# Patient Record
Sex: FEMALE | Race: BLACK OR AFRICAN AMERICAN | NOT HISPANIC OR LATINO | Employment: PART TIME | URBAN - METROPOLITAN AREA
[De-identification: names, ages, dates, MRNs, and addresses within clinical notes are randomized per-mention and may not be internally consistent; named-entity substitution may affect disease eponyms.]

---

## 2017-05-14 ENCOUNTER — HOSPITAL ENCOUNTER (EMERGENCY)
Facility: HOSPITAL | Age: 22
Discharge: HOME | End: 2017-05-14
Attending: EMERGENCY MEDICINE
Payer: MEDICAID

## 2017-05-14 VITALS
SYSTOLIC BLOOD PRESSURE: 130 MMHG | DIASTOLIC BLOOD PRESSURE: 89 MMHG | WEIGHT: 163 LBS | BODY MASS INDEX: 24.14 KG/M2 | HEART RATE: 94 BPM | TEMPERATURE: 99 F | OXYGEN SATURATION: 98 % | RESPIRATION RATE: 20 BRPM | HEIGHT: 69 IN

## 2017-05-14 DIAGNOSIS — L02.414 ABSCESS OF LEFT FOREARM: ICD-10-CM

## 2017-05-14 DIAGNOSIS — Z76.89 ENCOUNTER FOR INCISION AND DRAINAGE PROCEDURE: Primary | ICD-10-CM

## 2017-05-14 LAB
B-HCG UR QL: NEGATIVE
CTP QC/QA: YES

## 2017-05-14 PROCEDURE — 99283 EMERGENCY DEPT VISIT LOW MDM: CPT | Mod: 25

## 2017-05-14 PROCEDURE — 10060 I&D ABSCESS SIMPLE/SINGLE: CPT

## 2017-05-14 PROCEDURE — 25000003 PHARM REV CODE 250: Performed by: NURSE PRACTITIONER

## 2017-05-14 PROCEDURE — 81025 URINE PREGNANCY TEST: CPT | Performed by: NURSE PRACTITIONER

## 2017-05-14 RX ORDER — LIDOCAINE HYDROCHLORIDE 10 MG/ML
10 INJECTION INFILTRATION; PERINEURAL ONCE
Status: COMPLETED | OUTPATIENT
Start: 2017-05-14 | End: 2017-05-14

## 2017-05-14 RX ORDER — OXYCODONE AND ACETAMINOPHEN 5; 325 MG/1; MG/1
1 TABLET ORAL
Status: COMPLETED | OUTPATIENT
Start: 2017-05-14 | End: 2017-05-14

## 2017-05-14 RX ORDER — HYDROCODONE BITARTRATE AND ACETAMINOPHEN 5; 325 MG/1; MG/1
1 TABLET ORAL EVERY 6 HOURS PRN
COMMUNITY

## 2017-05-14 RX ORDER — SULFAMETHOXAZOLE AND TRIMETHOPRIM 800; 160 MG/1; MG/1
1 TABLET ORAL
COMMUNITY

## 2017-05-14 RX ORDER — HYDROCODONE BITARTRATE AND ACETAMINOPHEN 5; 325 MG/1; MG/1
1 TABLET ORAL EVERY 4 HOURS PRN
Qty: 10 TABLET | Refills: 0 | Status: SHIPPED | OUTPATIENT
Start: 2017-05-14

## 2017-05-14 RX ADMIN — LIDOCAINE HYDROCHLORIDE 10 ML: 10 INJECTION, SOLUTION INFILTRATION; PERINEURAL at 12:05

## 2017-05-14 RX ADMIN — OXYCODONE AND ACETAMINOPHEN 1 TABLET: 5; 325 TABLET ORAL at 12:05

## 2017-05-14 NOTE — ED PROVIDER NOTES
"Encounter Date: 5/14/2017       History     Chief Complaint   Patient presents with    Abscess     left inner forearm, seen at [facility] and prescribed oral antibiotics with pain med no I&D performed there, abscess now has drainage and more painful     Review of patient's allergies indicates:  No Known Allergies  HPI Comments: 23yo female presents for an evaluation of a left forearm abscess. Pt reports that about a week ago, she thought a spider bit her left foearm so she "tried to pop it" but the area got worse.  She went to [facility] ER on Friday and was given Bactrim, Norco, and motrin for the pain.  Pt states the area has been draining purulent material since Friday after she has been doing warm compresses to the area.  She reports that she has been taking her Bactrim as directed, but feels the area is getting worse "because it is draining and won't stop."  She denies fever, increased redness, increased pain, and vomiting.  She has never had an abscess previously.  Pt denies IV drug use.      Patient is a 22 y.o. female presenting with the following complaint: abscess. The history is provided by the patient.   Abscess    This is a new problem. The current episode started several days ago. The problem occurs continuously. The problem has been gradually worsening. Affected Location: left forearm. The pain is at a severity of 9/10. The abscess is characterized by draining, swelling, painfulness and redness. Pertinent negatives include no anorexia, no fever, no vomiting, no rhinorrhea, no sore throat and no cough. Her past medical history does not include atopy in family. There were no sick contacts. Recently, medical care has been given at another facility. Services received include medications given.     History reviewed. No pertinent past medical history.  History reviewed. No pertinent surgical history.  History reviewed. No pertinent family history.  Social History   Substance Use Topics    Smoking status: " Never Smoker    Smokeless tobacco: None    Alcohol use No     Review of Systems   Constitutional: Negative for chills, fatigue and fever.   HENT: Negative for rhinorrhea and sore throat.    Respiratory: Negative for cough.    Cardiovascular: Negative for chest pain.   Gastrointestinal: Negative for abdominal pain, anorexia, nausea and vomiting.   Musculoskeletal: Negative for myalgias.   Skin: Negative for rash.        Abscess left forearm     Allergic/Immunologic: Negative for immunocompromised state.   Neurological: Negative for weakness.   Psychiatric/Behavioral: Negative for confusion.   All other systems reviewed and are negative.      Physical Exam   Initial Vitals   BP Pulse Resp Temp SpO2   05/14/17 1126 05/14/17 1126 05/14/17 1126 05/14/17 1126 05/14/17 1126   130/89 94 20 98.5 °F (36.9 °C) 98 %     Physical Exam    Nursing note and vitals reviewed.  Constitutional: Vital signs are normal. She appears well-developed and well-nourished. She is active and cooperative. She is easily aroused.  Non-toxic appearance. She does not have a sickly appearance. She does not appear ill. No distress.   HENT:   Head: Normocephalic and atraumatic.   Eyes: Conjunctivae and EOM are normal.   Neck: Normal range of motion. Neck supple.   Cardiovascular: Normal rate.   Pulses:       Radial pulses are 2+ on the right side, and 2+ on the left side.   Pulmonary/Chest: Effort normal.   Abdominal: Soft. Normal appearance and bowel sounds are normal.   Musculoskeletal:        Left forearm: She exhibits tenderness. She exhibits no bony tenderness, no swelling, no edema, no deformity and no laceration.   Lymphadenopathy:     She has no axillary adenopathy.        Left: No epitrochlear adenopathy present.   Neurological: She is alert, oriented to person, place, and time and easily aroused. She has normal strength. GCS eye subscore is 4. GCS verbal subscore is 5. GCS motor subscore is 6.   Skin: Skin is warm, dry and intact. Abscess  (left forearm 2cm diameter, draining small amount of yellow drainage. ) noted. No rash noted. There is erythema.        Psychiatric: She has a normal mood and affect. Her speech is normal and behavior is normal. Judgment and thought content normal. Cognition and memory are normal.             ED Course   I & D - Incision and Drainage  Date/Time: 5/14/2017 12:30 PM  Location procedure was performed: [facility] EMERGENCY DEPARTMENT  Performed by: MELODIE BAUTISTA  Authorized by: MARY MISTRY   Pre-operative diagnosis: abscess  Post-operative diagnosis: abscess  Consent Done: Emergent Situation  Type: abscess  Body area: upper extremity  Location details: left arm  Anesthesia: local infiltration    Anesthesia:  Anesthesia: local infiltration  Local Anesthetic: lidocaine 1% without epinephrine   Anesthetic total: 4 mL  Patient sedated: no  Description of findings: Small amount of yellow pus   Scalpel size: 11  Incision type: single straight  Complexity: simple  Drainage: pus  Drainage amount: moderate  Wound treatment: incision,  drainage,  wound left open,  deloculation and  wound packed  Packing material: 1/2 in gauze  Complications: No  Estimated blood loss (mL): 3  Specimens: No  Implants: No  Patient tolerance: Patient tolerated the procedure well with no immediate complications        Labs Reviewed   POCT URINE PREGNANCY             Medical Decision Making:   Initial Assessment:   21yo female here for abscess of left forearm for nearly one week.  On Bactrim, taking as prescribed. Pt reports the abscess has been draining since Friday. Afebrile, tolerating PO. Left forearm abscess with mild surrounding erythema. No lymphadenopathy. Td is UTD.   Differential Diagnosis:   Abscess, cellulitis,   Clinical Tests:   Lab Tests: Ordered and Reviewed  The following lab test(s) were unremarkable: UPT  ED Management:  PO percocet, UPT, I&D  Improved abscess after I&D. Advised wound recheck in 2 days and continued use  of Bactrim as directed on the labeling.  Pt took a picture of the area with her phone and she was advised to monitor the area for change, progresses, or increased redness or swelling.  RX Norco.  Pt and boyfriend verbalized understanding, compliance, and agreement with treatment plan.                Attending Attestation:     Physician Attestation Statement for NP/PA:   I discussed this assessment and plan of this patient with the NP/PA, but I did not personally examine the patient. The face to face encounter was performed by the NP/PA.                  ED Course     Clinical Impression:   The primary encounter diagnosis was Encounter for incision and drainage procedure. A diagnosis of Abscess of left forearm was also pertinent to this visit.          EAMON Snowden  05/14/17 1519       Tereso Wilson MD  05/14/17 7467

## 2017-05-14 NOTE — ED AVS SNAPSHOT
[facility] [address]  [place] [address]               Alan Miller   2017 11:28 AM   ED    Description:  Female : 1995   Department:  [facility]           Your Care was Coordinated By:     Provider Role From To    Tereso Wilson MD Attending Provider 17 [phone] --    EAMON Snowden Nurse Practitioner 17 1136 --      Reason for Visit     Abscess           Diagnoses this Visit        Comments    Encounter for incision and drainage procedure    -  Primary     Abscess of left forearm           ED Disposition     None           To Do List           Follow-up Information     Follow up with Michael Ruano MD. Schedule an appointment as soon as possible for a visit in 2 days.    Specialty:  Pediatrics    Why:  For wound re-check    Contact information:    [address]  [place] [address]  [phone]         These Medications        Disp Refills Start End    hydrocodone-acetaminophen 5-325mg (NORCO) 5-325 mg per tablet 10 tablet 0 2017     Take 1 tablet by mouth every 4 (four) hours as needed for Pain (No driving. NO additional tylenol.  May be sedating.). - Oral      [facility]     [facility] On [facility] Nurse Care Line -  Assistance  Unless otherwise directed by your provider, please contact [facility], our nurse care line that is available for  assistance.     Registered nurses in the [facility] provide: appointment scheduling, clinical advisement, health education, and other advisory services.  Call: [phone] (toll free)               Medications           Message regarding Medications     Verify the changes and/or additions to your medication regime listed below are the same as discussed with your clinician today.  If any of these changes or additions are incorrect, please notify your healthcare provider.        START taking these  "NEW medications        Refills    hydrocodone-acetaminophen 5-325mg (NORCO) 5-325 mg per tablet 0    Sig: Take 1 tablet by mouth every 4 (four) hours as needed for Pain (No driving. NO additional tylenol.  May be sedating.).    Class: Print    Route: Oral      These medications were administered today        Dose Freq    oxycodone-acetaminophen 5-325 mg per tablet 1 tablet 1 tablet ED 1 Time    Sig: Take 1 tablet by mouth ED 1 Time.    Class: Normal    Route: Oral    Cosign for Ordering: Accepted by Tereso Wilson MD on 5/14/2017 12:06 PM    lidocaine HCL 10 mg/ml (1%) injection 10 mL 10 mL Once    Sig: 10 mLs by Other route once.    Class: Normal    Route: Other           Verify that the below list of medications is an accurate representation of the medications you are currently taking.  If none reported, the list may be blank. If incorrect, please contact your healthcare provider. Carry this list with you in case of emergency.           Current Medications     hydrocodone-acetaminophen 5-325mg (NORCO) 5-325 mg per tablet Take 1 tablet by mouth every 6 (six) hours as needed for Pain.    sulfamethoxazole-trimethoprim 800-160mg (BACTRIM DS) 800-160 mg Tab Take 1 tablet by mouth every 12 (twelve) hours.    hydrocodone-acetaminophen 5-325mg (NORCO) 5-325 mg per tablet Take 1 tablet by mouth every 4 (four) hours as needed for Pain (No driving. NO additional tylenol.  May be sedating.).    lidocaine HCl 2% (LIDOCAINE VISCOUS) 2 % Soln by Mucous Membrane route every 4 (four) hours. DILUTE 5 ML IN SMALL AMOUNT OF WARM WATER, GARGLE AND SPIT FOR RELIEF OF SORE THROAT.    naproxen (NAPROSYN) 375 MG tablet Take 1 tablet (375 mg total) by mouth 2 (two) times daily with meals.           Clinical Reference Information           Your Vitals Were     BP Pulse Temp Resp Height Weight    130/89 94 98.5 °F (36.9 °C) 20 5' 9" (1.753 m) 73.9 kg (163 lb)    SpO2 BMI             98% 24.07 kg/m2         Allergies as of 5/14/2017     " No Known Allergies      Immunizations Administered on Date of Encounter - 5/14/2017     None      ED Micro, Lab, POCT     Start Ordered       Status Ordering Provider    05/14/17 1146 05/14/17 1145  POCT urine pregnancy  Once      Final result       ED Imaging Orders     None        Discharge Instructions         Abscess (Incision & Drainage)  An abscess (sometimes called a boil) occurs when bacteria get trapped under the skin and start to grow. Pus forms inside the abscess as the body responds to the bacteria. An abscess can happen with an insect bite, ingrown hair, blocked oil gland, pimple, cyst, or puncture wound.  Your healthcare provider has drained the pus from your abscess. If the abscess pocket was large, your healthcare provider may have inserted gauze packing. Your provider will need to remove and possibly replace it on your next visit. You may not need antibiotics to treat a simple abscess, unless the infection is spreading into the skin around the wound (cellulitis).  Healing of the wound will take about 1 to 2 weeks, depending on the size of the abscess. Healthy tissue will grow from the bottom and sides of the opening until it seals over.  Home care  These tips can help your wound heal:  · The wound may drain for the first 2 days. Cover the wound with a clean dry dressing. If the dressing becomes soaked with blood or pus, change it.  · If a gauze packing was placed inside the abscess cavity, you may be told to remove it yourself. You may do this in the shower. Once the packing is removed, you should wash the area in the shower or bath 3 to 4 times a day, until the skin opening has closed. Make sure you wash your hands after changing the packing or cleaning the wound.  · If you were prescribed antibiotics, take them as directed until they are all gone.  · You may use acetaminophen or ibuprofen to control pain, unless another pain medicine was prescribed. If you have liver disease or ever had a  stomach ulcer, talk with your doctor before using these medicines.  Follow-up care  Follow up with your healthcare provider, or as advised. If a gauze packing was inserted in your wound, it should be removed in 1 to 2 days. Check your wound every day for the signs of worsening infection listed below.  When to seek medical advice  Call your healthcare provider right away if any of these occur:  · Increasing redness or swelling  · Red streaks in the skin leading away from the wound  · Increasing local pain or swelling  · Continued pus draining from the wound 2 days after treatment  · Fever of 100.4ºF (38ºC) or higher, or as directed by your healthcare provider  · Boil returns when you are at home  Date Last Reviewed: 9/1/2016  © 4949-7034 [employer]. [address] [place] [address]. All rights reserved. This information is not intended as a substitute for professional medical care. Always follow your healthcare professional's instructions.          [facility] Sign-Up     Activating your [facility] account is as easy as 1-2-3!     1) Visit [email or web address], select Sign Up Now, enter this activation code and your date of birth, then select Next.  [email or web address]  Expires: 6/28/2017 12:44 PM      2) Create a username and password to use when you visit [facility] in the future and select a security question in case you lose your password and select Next.    3) Enter your e-mail address and click Sign Up!    Additional Information  If you have questions, please e-mail [email or web address] or call [phone] to talk to our [facility] staff. Remember, [facility] is NOT to be used for urgent needs. For medical emergencies, dial 911.          [facility] complies with applicable Federal civil rights laws and does not discriminate on the basis of race, color, national origin, age, disability, or sex.        Language Assistance Services     ATTENTION: Language assistance services are  available, free of charge. Please call [phone].      ATENCIÓN: Si habla español, tiene a goodson disposición servicios gratuitos de asistencia lingüística. Llame al [phone].     CHÚ Ý: N?u b?n nói Ti?ng Vi?t, có các d?ch v? h? tr? ngôn ng? mi?n phí dành cho b?n. G?i s? [phone].

## 2017-05-14 NOTE — DISCHARGE INSTRUCTIONS
Abscess (Incision & Drainage)  An abscess (sometimes called a boil) occurs when bacteria get trapped under the skin and start to grow. Pus forms inside the abscess as the body responds to the bacteria. An abscess can happen with an insect bite, ingrown hair, blocked oil gland, pimple, cyst, or puncture wound.  Your healthcare provider has drained the pus from your abscess. If the abscess pocket was large, your healthcare provider may have inserted gauze packing. Your provider will need to remove and possibly replace it on your next visit. You may not need antibiotics to treat a simple abscess, unless the infection is spreading into the skin around the wound (cellulitis).  Healing of the wound will take about 1 to 2 weeks, depending on the size of the abscess. Healthy tissue will grow from the bottom and sides of the opening until it seals over.  Home care  These tips can help your wound heal:  · The wound may drain for the first 2 days. Cover the wound with a clean dry dressing. If the dressing becomes soaked with blood or pus, change it.  · If a gauze packing was placed inside the abscess cavity, you may be told to remove it yourself. You may do this in the shower. Once the packing is removed, you should wash the area in the shower or bath 3 to 4 times a day, until the skin opening has closed. Make sure you wash your hands after changing the packing or cleaning the wound.  · If you were prescribed antibiotics, take them as directed until they are all gone.  · You may use acetaminophen or ibuprofen to control pain, unless another pain medicine was prescribed. If you have liver disease or ever had a stomach ulcer, talk with your doctor before using these medicines.  Follow-up care  Follow up with your healthcare provider, or as advised. If a gauze packing was inserted in your wound, it should be removed in 1 to 2 days. Check your wound every day for the signs of worsening infection listed below.  When to seek  medical advice  Call your healthcare provider right away if any of these occur:  · Increasing redness or swelling  · Red streaks in the skin leading away from the wound  · Increasing local pain or swelling  · Continued pus draining from the wound 2 days after treatment  · Fever of 100.4ºF (38ºC) or higher, or as directed by your healthcare provider  · Boil returns when you are at home  Date Last Reviewed: 9/1/2016  © 4842-1263 [employer]. [address] [place] [address]. All rights reserved. This information is not intended as a substitute for professional medical care. Always follow your healthcare professional's instructions.

## 2017-05-14 NOTE — ED NOTES
Open draining abscess to left inner forearm with red, cellulitic area surrounding.  Ongoing x 1 1/2 weeks, was seen at [place] and prescribed abx, but has not improved.

## 2017-05-17 ENCOUNTER — HOSPITAL ENCOUNTER (EMERGENCY)
Facility: HOSPITAL | Age: 22
Discharge: HOME | End: 2017-05-17
Attending: EMERGENCY MEDICINE
Payer: MEDICAID

## 2017-05-17 VITALS
SYSTOLIC BLOOD PRESSURE: 130 MMHG | OXYGEN SATURATION: 98 % | TEMPERATURE: 99 F | BODY MASS INDEX: 24.14 KG/M2 | WEIGHT: 163 LBS | HEART RATE: 87 BPM | HEIGHT: 69 IN | RESPIRATION RATE: 20 BRPM | DIASTOLIC BLOOD PRESSURE: 93 MMHG

## 2017-05-17 DIAGNOSIS — Z51.89 WOUND CHECK, ABSCESS: Primary | ICD-10-CM

## 2017-05-17 PROCEDURE — 99281 EMR DPT VST MAYX REQ PHY/QHP: CPT

## 2017-05-17 NOTE — ED AVS SNAPSHOT
[facility]  [address]  [place] [address]               Alan Miller   2017 11:50 AM   ED    Description:  Female : 1995   Department:  [facility]           Your Care was Coordinated By:     Provider Role From To    Nick Powers MD Attending Provider 17 [phone] --      Reason for Visit     Wound Check           Diagnoses this Visit        Comments    Wound check, abscess    -  Primary       ED Disposition     None           To Do List           Follow-up Information     Schedule an appointment as soon as possible for a visit with Michael Ruano MD.    Specialty:  Pediatrics    Contact information:    [address]  [address]  [place] [address]  [phone]        [facility] On Call     [facility] Nurse Care Line -  Assistance  Unless otherwise directed by your provider, please contact [facility], our nurse care line that is available for  assistance.     Registered nurses in the [facility] provide: appointment scheduling, clinical advisement, health education, and other advisory services.  Call: [phone] (toll free)               Medications           Message regarding Medications     Verify the changes and/or additions to your medication regime listed below are the same as discussed with your clinician today.  If any of these changes or additions are incorrect, please notify your healthcare provider.             Verify that the below list of medications is an accurate representation of the medications you are currently taking.  If none reported, the list may be blank. If incorrect, please contact your healthcare provider. Carry this list with you in case of emergency.           Current Medications     hydrocodone-acetaminophen 5-325mg (NORCO) 5-325 mg per tablet Take 1 tablet by mouth every 6 (six) hours as needed for Pain.    hydrocodone-acetaminophen 5-325mg (NORCO)  "5-325 mg per tablet Take 1 tablet by mouth every 4 (four) hours as needed for Pain (No driving. NO additional tylenol.  May be sedating.).    lidocaine HCl 2% (LIDOCAINE VISCOUS) 2 % Soln by Mucous Membrane route every 4 (four) hours. DILUTE 5 ML IN SMALL AMOUNT OF WARM WATER, GARGLE AND SPIT FOR RELIEF OF SORE THROAT.    naproxen (NAPROSYN) 375 MG tablet Take 1 tablet (375 mg total) by mouth 2 (two) times daily with meals.    sulfamethoxazole-trimethoprim 800-160mg (BACTRIM DS) 800-160 mg Tab Take 1 tablet by mouth every 12 (twelve) hours.           Clinical Reference Information           Your Vitals Were     BP Pulse Temp Resp Height Weight    130/93 87 99.1 °F (37.3 °C) 20 5' 9" (1.753 m) 73.9 kg (163 lb)    SpO2 BMI             98% 24.07 kg/m2         Allergies as of 5/17/2017     No Known Allergies      Immunizations Administered on Date of Encounter - 5/17/2017     None      ED Micro, Lab, POCT     None      ED Imaging Orders     None      Discharge References/Attachments     ABSCESS, INCISION AND DRAINAGE (ENGLISH)    WOUND CARE (ENGLISH)      [facility] Sign-Up     Activating your [facility] account is as easy as 1-2-3!     1) Visit [email or web address], select Sign Up Now, enter this activation code and your date of birth, then select Next.  [email or web address]  Expires: 6/28/2017 12:44 PM      2) Create a username and password to use when you visit [facility] in the future and select a security question in case you lose your password and select Next.    3) Enter your e-mail address and click Sign Up!    Additional Information  If you have questions, please e-mail [email or web address] or call [phone] to talk to our [facility] staff. Remember, [facility] is NOT to be used for urgent needs. For medical emergencies, dial 911.          [facility] complies with applicable Federal civil rights laws and does not discriminate on the basis of race, color, national origin, age, disability, or sex.      "   Language Assistance Services     ATTENTION: Language assistance services are available, free of charge. Please call [phone].      ATENCIÓN: Si habla francisañol, tiene a goodson disposición servicios gratuitos de asistencia lingüística. Llame al [phone].     CHÚ Ý: N?u b?n nói Ti?ng Vi?t, có các d?ch v? h? tr? ngôn ng? mi?n phí dành cho b?n. G?i s? [phone].

## 2017-05-17 NOTE — ED PROVIDER NOTES
Encounter Date: 5/17/2017       History     Chief Complaint   Patient presents with    Wound Check     left forearm wound check, I&D done with packing     Review of patient's allergies indicates:  No Known Allergies  HPI Comments: 23 y/o F presents to the ED for wound check and packing removal. Caryn was seen here for I&D of left anterior mid forearm abscess and packing was placed. Px reports her pain has improved, denies any fever. Denies any other symptoms. Denies any headache, lightheadedness/dizziness, nausea/vomiting, fever/chills, chest pain, SOB, abdominal pain, constipation/diarrhea, dysuria/hematuria.     The history is provided by the patient.     History reviewed. No pertinent past medical history.  History reviewed. No pertinent surgical history.  History reviewed. No pertinent family history.  Social History   Substance Use Topics    Smoking status: Never Smoker    Smokeless tobacco: None    Alcohol use No     Review of Systems   Constitutional: Negative for chills, fatigue and fever.   HENT: Negative for congestion, rhinorrhea, sore throat and voice change.    Eyes: Negative for photophobia, pain and redness.   Respiratory: Negative for cough, choking and shortness of breath.    Cardiovascular: Negative for chest pain, palpitations and leg swelling.   Gastrointestinal: Negative for abdominal pain, diarrhea, nausea and vomiting.   Genitourinary: Negative for dysuria, frequency and urgency.   Musculoskeletal: Negative for back pain, neck pain and neck stiffness.   Neurological: Negative for seizures, speech difficulty, light-headedness, numbness and headaches.   All other systems reviewed and are negative.      Physical Exam   Initial Vitals   BP Pulse Resp Temp SpO2   05/17/17 1146 05/17/17 1146 05/17/17 1146 05/17/17 1146 05/17/17 1146   130/93 87 20 99.1 °F (37.3 °C) 98 %     Physical Exam    Nursing note and vitals reviewed.  Constitutional: She appears well-developed and well-nourished. No  distress.   HENT:   Head: Normocephalic and atraumatic.   Mouth/Throat: Oropharynx is clear and moist.   Eyes: Conjunctivae and EOM are normal. Pupils are equal, round, and reactive to light.   Neck: Normal range of motion. Neck supple. No tracheal deviation present.   Cardiovascular: Normal rate, regular rhythm, normal heart sounds and intact distal pulses.   Pulmonary/Chest: Breath sounds normal. No respiratory distress. She has no wheezes. She has no rhonchi. She has no rales.   Abdominal: Soft. Bowel sounds are normal. She exhibits no distension. There is no tenderness. There is no rebound and no guarding.   Musculoskeletal: Normal range of motion. She exhibits no edema or tenderness.        Arms:  Neurological: She is alert and oriented to person, place, and time. She has normal strength. No cranial nerve deficit or sensory deficit.   Skin: Skin is warm and dry.         ED Course   Procedures  Labs Reviewed - No data to display          Medical Decision Making:   Initial Assessment:   21 y/o F presents to the ED for wound check and packing removal  Differential Diagnosis:   Cellulitis  ED Management:  Px tolerated removal well. Discussed disposition and wound care, including instructions to wash the area 2x per day with warm water and soap, reasons to return to ED including spreading erythema, purulent drainage, fever, worsening pain, or for any other emergent symptoms. Also instructed to f/u with PCP. Px expressed good understanding and is comfortable with discharge at this time.                    ED Course     Clinical Impression:   The encounter diagnosis was Wound check, abscess.    Disposition:   Disposition: Discharged  Condition: Stable       Nick Powers MD  05/17/17 [id number]

## 2017-05-17 NOTE — ED NOTES
Presents for wound check for abscess I&D to left forearm the other day. No redness, purulent drainage or swelling noted to wound. Dr. Powers at bedside for packing removal.